# Patient Record
Sex: MALE | Race: WHITE | NOT HISPANIC OR LATINO
[De-identification: names, ages, dates, MRNs, and addresses within clinical notes are randomized per-mention and may not be internally consistent; named-entity substitution may affect disease eponyms.]

---

## 2017-03-25 PROBLEM — Z00.00 ENCOUNTER FOR PREVENTIVE HEALTH EXAMINATION: Status: ACTIVE | Noted: 2017-03-25

## 2017-04-10 ENCOUNTER — APPOINTMENT (OUTPATIENT)
Dept: UROLOGY | Facility: CLINIC | Age: 62
End: 2017-04-10

## 2017-04-10 ENCOUNTER — TRANSCRIPTION ENCOUNTER (OUTPATIENT)
Age: 62
End: 2017-04-10

## 2017-04-10 VITALS — WEIGHT: 225 LBS | HEIGHT: 76 IN | BODY MASS INDEX: 27.4 KG/M2

## 2017-04-10 DIAGNOSIS — Z78.9 OTHER SPECIFIED HEALTH STATUS: ICD-10-CM

## 2017-04-10 DIAGNOSIS — Z87.438 PERSONAL HISTORY OF OTHER DISEASES OF MALE GENITAL ORGANS: ICD-10-CM

## 2017-04-10 RX ORDER — AZELASTINE HYDROCHLORIDE 0.5 MG/ML
0.05 SOLUTION/ DROPS OPHTHALMIC
Qty: 6 | Refills: 0 | Status: ACTIVE | COMMUNITY
Start: 2017-03-06

## 2017-04-10 RX ORDER — FLUTICASONE PROPIONATE 50 UG/1
50 SPRAY, METERED NASAL
Qty: 16 | Refills: 0 | Status: ACTIVE | COMMUNITY
Start: 2017-03-04

## 2017-04-11 LAB
ALBUMIN SERPL ELPH-MCNC: 4.6 G/DL
ALP BLD-CCNC: 81 U/L
ALT SERPL-CCNC: 45 U/L
ANION GAP SERPL CALC-SCNC: 17 MMOL/L
AST SERPL-CCNC: 44 U/L
BASOPHILS # BLD AUTO: 0.02 K/UL
BASOPHILS NFR BLD AUTO: 0.3 %
BILIRUB SERPL-MCNC: 0.5 MG/DL
BUN SERPL-MCNC: 17 MG/DL
CALCIUM SERPL-MCNC: 9.7 MG/DL
CHLORIDE SERPL-SCNC: 104 MMOL/L
CO2 SERPL-SCNC: 21 MMOL/L
CREAT SERPL-MCNC: 0.97 MG/DL
EOSINOPHIL # BLD AUTO: 0.19 K/UL
EOSINOPHIL NFR BLD AUTO: 2.9 %
ESTRADIOL SERPL-MCNC: 21 PG/ML
GLUCOSE SERPL-MCNC: 94 MG/DL
HCT VFR BLD CALC: 42.3 %
HGB BLD-MCNC: 13.4 G/DL
IMM GRANULOCYTES NFR BLD AUTO: 0.2 %
LYMPHOCYTES # BLD AUTO: 1.86 K/UL
LYMPHOCYTES NFR BLD AUTO: 28.5 %
MAN DIFF?: NORMAL
MCHC RBC-ENTMCNC: 30.5 PG
MCHC RBC-ENTMCNC: 31.7 GM/DL
MCV RBC AUTO: 96.4 FL
MONOCYTES # BLD AUTO: 0.5 K/UL
MONOCYTES NFR BLD AUTO: 7.7 %
NEUTROPHILS # BLD AUTO: 3.95 K/UL
NEUTROPHILS NFR BLD AUTO: 60.4 %
PLATELET # BLD AUTO: 262 K/UL
POTASSIUM SERPL-SCNC: 4.9 MMOL/L
PROT SERPL-MCNC: 7 G/DL
RBC # BLD: 4.39 M/UL
RBC # FLD: 14.3 %
SODIUM SERPL-SCNC: 142 MMOL/L
TESTOST SERPL-MCNC: 645.5 NG/DL
WBC # FLD AUTO: 6.53 K/UL

## 2017-04-28 ENCOUNTER — MESSAGE (OUTPATIENT)
Age: 62
End: 2017-04-28

## 2017-05-19 ENCOUNTER — APPOINTMENT (OUTPATIENT)
Dept: UROLOGY | Facility: CLINIC | Age: 62
End: 2017-05-19

## 2017-06-27 ENCOUNTER — OTHER (OUTPATIENT)
Age: 62
End: 2017-06-27

## 2017-08-04 ENCOUNTER — APPOINTMENT (OUTPATIENT)
Dept: UROLOGY | Facility: CLINIC | Age: 62
End: 2017-08-04
Payer: COMMERCIAL

## 2017-08-04 PROCEDURE — 54235 NJX CORPORA CAVERNOSA RX AGT: CPT

## 2017-09-11 ENCOUNTER — APPOINTMENT (OUTPATIENT)
Dept: UROLOGY | Facility: CLINIC | Age: 62
End: 2017-09-11
Payer: COMMERCIAL

## 2017-09-11 PROCEDURE — 99213 OFFICE O/P EST LOW 20 MIN: CPT

## 2017-09-11 RX ORDER — TADALAFIL 20 MG/1
20 TABLET, FILM COATED ORAL
Qty: 9 | Refills: 3 | Status: DISCONTINUED | OUTPATIENT
Start: 2017-04-10 | End: 2017-09-11

## 2017-09-11 RX ORDER — TESTOSTERONE 50 MG/5G
50 MG/5GM GEL TRANSDERMAL DAILY
Qty: 30 | Refills: 0 | Status: DISCONTINUED | COMMUNITY
Start: 2017-04-10 | End: 2017-09-11

## 2017-12-04 ENCOUNTER — APPOINTMENT (OUTPATIENT)
Dept: UROLOGY | Facility: CLINIC | Age: 62
End: 2017-12-04
Payer: COMMERCIAL

## 2017-12-04 ENCOUNTER — OTHER (OUTPATIENT)
Age: 62
End: 2017-12-04

## 2017-12-04 PROCEDURE — 99213 OFFICE O/P EST LOW 20 MIN: CPT

## 2017-12-05 ENCOUNTER — MOBILE ON CALL (OUTPATIENT)
Age: 62
End: 2017-12-05

## 2017-12-05 LAB
APPEARANCE: CLEAR
BACTERIA: NEGATIVE
BILIRUBIN URINE: NEGATIVE
BLOOD URINE: NEGATIVE
COLOR: ABNORMAL
GLUCOSE QUALITATIVE U: NEGATIVE MG/DL
HYALINE CASTS: 1 /LPF
KETONES URINE: NEGATIVE
LEUKOCYTE ESTERASE URINE: NEGATIVE
MICROSCOPIC-UA: NORMAL
NITRITE URINE: NEGATIVE
PH URINE: 7
PROTEIN URINE: NEGATIVE MG/DL
RED BLOOD CELLS URINE: 6 /HPF
SPECIFIC GRAVITY URINE: 1.03
SQUAMOUS EPITHELIAL CELLS: 0 /HPF
UROBILINOGEN URINE: NEGATIVE MG/DL
WHITE BLOOD CELLS URINE: 0 /HPF

## 2017-12-07 LAB
ANION GAP SERPL CALC-SCNC: 14 MMOL/L
BUN SERPL-MCNC: 17 MG/DL
CALCIUM SERPL-MCNC: 9.7 MG/DL
CHLORIDE SERPL-SCNC: 102 MMOL/L
CO2 SERPL-SCNC: 26 MMOL/L
CREAT SERPL-MCNC: 1.11 MG/DL
GLUCOSE SERPL-MCNC: 92 MG/DL
POTASSIUM SERPL-SCNC: 5 MMOL/L
PSA SERPL-MCNC: 0.47 NG/ML
SODIUM SERPL-SCNC: 142 MMOL/L

## 2017-12-08 LAB
BACTERIA UR CULT: NORMAL
CORE LAB FLUID CYTOLOGY: NORMAL

## 2018-06-25 ENCOUNTER — APPOINTMENT (OUTPATIENT)
Dept: UROLOGY | Facility: CLINIC | Age: 63
End: 2018-06-25
Payer: COMMERCIAL

## 2018-06-25 PROCEDURE — 99213 OFFICE O/P EST LOW 20 MIN: CPT

## 2018-06-26 LAB
APPEARANCE: CLEAR
BACTERIA: NEGATIVE
BILIRUBIN URINE: NEGATIVE
BLOOD URINE: NEGATIVE
COLOR: YELLOW
GLUCOSE QUALITATIVE U: NEGATIVE MG/DL
HYALINE CASTS: 1 /LPF
KETONES URINE: NEGATIVE
LEUKOCYTE ESTERASE URINE: NEGATIVE
MICROSCOPIC-UA: NORMAL
NITRITE URINE: NEGATIVE
PH URINE: 5
PROTEIN URINE: NEGATIVE MG/DL
RED BLOOD CELLS URINE: 1 /HPF
SPECIFIC GRAVITY URINE: 1.02
SQUAMOUS EPITHELIAL CELLS: 0 /HPF
UROBILINOGEN URINE: NEGATIVE MG/DL
WHITE BLOOD CELLS URINE: 1 /HPF

## 2018-07-27 PROBLEM — Z78.9 ALCOHOL USE: Status: ACTIVE | Noted: 2017-04-10

## 2018-12-10 ENCOUNTER — APPOINTMENT (OUTPATIENT)
Dept: UROLOGY | Facility: CLINIC | Age: 63
End: 2018-12-10
Payer: COMMERCIAL

## 2018-12-10 DIAGNOSIS — N53.19 OTHER EJACULATORY DYSFUNCTION: ICD-10-CM

## 2018-12-10 DIAGNOSIS — Z85.828 PERSONAL HISTORY OF OTHER MALIGNANT NEOPLASM OF SKIN: ICD-10-CM

## 2018-12-10 PROCEDURE — 51741 ELECTRO-UROFLOWMETRY FIRST: CPT

## 2018-12-10 PROCEDURE — 99214 OFFICE O/P EST MOD 30 MIN: CPT | Mod: 25

## 2018-12-10 PROCEDURE — 51798 US URINE CAPACITY MEASURE: CPT | Mod: 59

## 2018-12-10 RX ORDER — TADALAFIL 20 MG/1
20 TABLET ORAL
Qty: 10 | Refills: 3 | Status: ACTIVE | COMMUNITY
Start: 2018-06-25 | End: 1900-01-01

## 2018-12-11 LAB
APPEARANCE: CLEAR
BACTERIA: NEGATIVE
BILIRUBIN URINE: NEGATIVE
BLOOD URINE: NEGATIVE
COLOR: YELLOW
GLUCOSE QUALITATIVE U: NEGATIVE MG/DL
HYALINE CASTS: 1 /LPF
KETONES URINE: NEGATIVE
LEUKOCYTE ESTERASE URINE: NEGATIVE
MICROSCOPIC-UA: NORMAL
NITRITE URINE: NEGATIVE
PH URINE: 5
PROTEIN URINE: NEGATIVE MG/DL
RED BLOOD CELLS URINE: 0 /HPF
SPECIFIC GRAVITY URINE: 1.02
SQUAMOUS EPITHELIAL CELLS: 0 /HPF
UROBILINOGEN URINE: NEGATIVE MG/DL
WHITE BLOOD CELLS URINE: 0 /HPF

## 2018-12-19 LAB
ANION GAP SERPL CALC-SCNC: 11 MMOL/L
BUN SERPL-MCNC: 19 MG/DL
CALCIUM SERPL-MCNC: 9.6 MG/DL
CHLORIDE SERPL-SCNC: 102 MMOL/L
CO2 SERPL-SCNC: 26 MMOL/L
CREAT SERPL-MCNC: 1 MG/DL
GLUCOSE SERPL-MCNC: 91 MG/DL
POTASSIUM SERPL-SCNC: 4.7 MMOL/L
PSA SERPL-MCNC: 0.5 NG/ML
SODIUM SERPL-SCNC: 139 MMOL/L
TESTOST SERPL-MCNC: 327.7 NG/DL

## 2019-12-05 ENCOUNTER — TRANSCRIPTION ENCOUNTER (OUTPATIENT)
Age: 64
End: 2019-12-05

## 2019-12-11 ENCOUNTER — RX RENEWAL (OUTPATIENT)
Age: 64
End: 2019-12-11

## 2019-12-16 ENCOUNTER — APPOINTMENT (OUTPATIENT)
Dept: UROLOGY | Facility: CLINIC | Age: 64
End: 2019-12-16
Payer: COMMERCIAL

## 2019-12-16 PROCEDURE — 51741 ELECTRO-UROFLOWMETRY FIRST: CPT

## 2019-12-16 PROCEDURE — 51798 US URINE CAPACITY MEASURE: CPT | Mod: 59

## 2019-12-16 PROCEDURE — 99213 OFFICE O/P EST LOW 20 MIN: CPT | Mod: 25

## 2019-12-16 RX ORDER — TADALAFIL 20 MG/1
20 TABLET, FILM COATED ORAL
Qty: 3 | Refills: 0 | Status: DISCONTINUED | COMMUNITY
Start: 2016-07-13 | End: 2019-12-16

## 2019-12-16 RX ORDER — KETOCONAZOLE 20.5 MG/ML
2 SHAMPOO, SUSPENSION TOPICAL
Qty: 120 | Refills: 0 | Status: DISCONTINUED | COMMUNITY
Start: 2017-01-03 | End: 2019-12-16

## 2019-12-16 RX ORDER — TESTOSTERONE 50 MG/5G
50 MG/5GM GEL TRANSDERMAL
Qty: 150 | Refills: 0 | Status: DISCONTINUED | COMMUNITY
Start: 2016-12-27 | End: 2019-12-16

## 2019-12-16 NOTE — ASSESSMENT
[FreeTextEntry1] : Testosterone T 503\par free T 42 (normal )\par \par urinary symptoms stable\par low PVR and flow max 13.6\par discussed medical management \par patient not interested\par \par continue to tadalafil and BIMIX\par will renew\par no fibrosis

## 2019-12-16 NOTE — PHYSICAL EXAM
[General Appearance - Well Developed] : well developed [General Appearance - Well Nourished] : well nourished [Well Groomed] : well groomed [General Appearance - In No Acute Distress] : no acute distress [Normal Appearance] : normal appearance [Abdomen Soft] : soft [Abdomen Tenderness] : non-tender [Urethral Meatus] : meatus normal [Costovertebral Angle Tenderness] : no ~M costovertebral angle tenderness [Scrotum] : the scrotum was normal [Urinary Bladder Findings] : the bladder was normal on palpation [Testes Mass (___cm)] : there were no testicular masses [No Prostate Nodules] : no prostate nodules [Prostate Enlargement] : the prostate was not enlarged [Edema] : no peripheral edema [Skin Color & Pigmentation] : normal skin color and pigmentation [Oriented To Time, Place, And Person] : oriented to person, place, and time [Exaggerated Use Of Accessory Muscles For Inspiration] : no accessory muscle use [Normal Station and Gait] : the gait and station were normal for the patient's age [Motor Exam] : the motor exam was normal [Inguinal Lymph Nodes Enlarged Bilaterally] : inguinal [FreeTextEntry1] : no fibrosis; 12 cm

## 2019-12-16 NOTE — HISTORY OF PRESENT ILLNESS
[FreeTextEntry1] : Patient started on BIMIX\par Has dose escalated on 14 units\par Erection 8-9/10\par lasted 1 hour\par Able to have SI\par No pain\par No curvature\par \par 12.4.2017\par utilizing BIMIX 14 units\par "supplementing with Cialis 20 mg"\par lasting one hour \par erection 7.5/10\par has not dose escalated \par no curvature\par \par 6.25.2018\par followup ED\par Cialis by itself not as effective \par needs BIMIX 14-15 better 8-9/10\par Has not attempted to BIMIX dose escalation\par not effective for 15 minutes after injection\par last 1 hour\par \par 12.10.2018\par continues to utilize BIMIX 20 units\par excellent erection\par 2-3 per month\par utilizes Cialis 20 mg cha\par urinary frequency persists; variable\par \par 12.16.2019\par urinary frequency \par variable nocturia\par no hematuria \par no dysuria\par taking Cialis variable\par utilizing BIMIX injection 22 units\par satisfactory pleased

## 2019-12-17 LAB
APPEARANCE: CLEAR
BACTERIA: NEGATIVE
BILIRUBIN URINE: NEGATIVE
BLOOD URINE: NEGATIVE
COLOR: NORMAL
GLUCOSE QUALITATIVE U: NEGATIVE
HYALINE CASTS: 1 /LPF
KETONES URINE: NEGATIVE
LEUKOCYTE ESTERASE URINE: NEGATIVE
MICROSCOPIC-UA: NORMAL
NITRITE URINE: NEGATIVE
PH URINE: 5
PROTEIN URINE: NEGATIVE
RED BLOOD CELLS URINE: 1 /HPF
SPECIFIC GRAVITY URINE: 1.02
SQUAMOUS EPITHELIAL CELLS: 0 /HPF
UROBILINOGEN URINE: NORMAL
WHITE BLOOD CELLS URINE: 0 /HPF

## 2019-12-27 ENCOUNTER — TRANSCRIPTION ENCOUNTER (OUTPATIENT)
Age: 64
End: 2019-12-27

## 2019-12-27 LAB — PSA SERPL-MCNC: 0.29 NG/ML

## 2020-06-08 ENCOUNTER — APPOINTMENT (OUTPATIENT)
Dept: UROLOGY | Facility: CLINIC | Age: 65
End: 2020-06-08

## 2020-08-10 ENCOUNTER — APPOINTMENT (OUTPATIENT)
Dept: UROLOGY | Facility: CLINIC | Age: 65
End: 2020-08-10
Payer: COMMERCIAL

## 2020-08-10 VITALS
HEIGHT: 76 IN | TEMPERATURE: 97.8 F | OXYGEN SATURATION: 98 % | DIASTOLIC BLOOD PRESSURE: 83 MMHG | BODY MASS INDEX: 28.01 KG/M2 | WEIGHT: 230 LBS | SYSTOLIC BLOOD PRESSURE: 158 MMHG | HEART RATE: 75 BPM

## 2020-08-10 PROCEDURE — 51798 US URINE CAPACITY MEASURE: CPT | Mod: 59

## 2020-08-10 PROCEDURE — 99213 OFFICE O/P EST LOW 20 MIN: CPT | Mod: 25

## 2020-08-10 PROCEDURE — 51741 ELECTRO-UROFLOWMETRY FIRST: CPT

## 2020-08-10 NOTE — ASSESSMENT
[FreeTextEntry1] : Doing well \par continues on IC\par 22 units/ 1 hour\par \par libiido "low" \par had been on T replacement then stopped secondary to insurance\par will recheck T\par "internist said low"\par will reassess\par \par flow excellent\par mild LUTS\par PVR 44\par not interested in medical therapy\par \par [employer]\par second daughter Mari\par \par

## 2020-08-10 NOTE — PHYSICAL EXAM
[General Appearance - Well Nourished] : well nourished [Normal Appearance] : normal appearance [General Appearance - Well Developed] : well developed [Abdomen Soft] : soft [Well Groomed] : well groomed [General Appearance - In No Acute Distress] : no acute distress [Abdomen Tenderness] : non-tender [Urethral Meatus] : meatus normal [Costovertebral Angle Tenderness] : no ~M costovertebral angle tenderness [Testes Mass (___cm)] : there were no testicular masses [Scrotum] : the scrotum was normal [Urinary Bladder Findings] : the bladder was normal on palpation [Prostate Enlargement] : the prostate was not enlarged [No Prostate Nodules] : no prostate nodules [Normal Station and Gait] : the gait and station were normal for the patient's age [Skin Color & Pigmentation] : normal skin color and pigmentation [FreeTextEntry1] : seeing dermatologist

## 2020-08-10 NOTE — HISTORY OF PRESENT ILLNESS
[FreeTextEntry1] : Patient started on BIMIX\par Has dose escalated on 14 units\par Erection 8-9/10\par lasted 1 hour\par Able to have SI\par No pain\par No curvature\par \par 12.4.2017\par utilizing BIMIX 14 units\par "supplementing with Cialis 20 mg"\par lasting one hour \par erection 7.5/10\par has not dose escalated \par no curvature\par \par 6.25.2018\par followup ED\par Cialis by itself not as effective \par needs BIMIX 14-15 better 8-9/10\par Has not attempted to BIMIX dose escalation\par not effective for 15 minutes after injection\par last 1 hour\par \par 12.10.2018\par continues to utilize BIMIX 20 units\par excellent erection\par 2-3 per month\par utilizes Cialis 20 mg cha\par urinary frequency persists; variable\par \par 12.16.2019\par urinary frequency \par variable nocturia\par no hematuria \par no dysuria\par taking Cialis variable\par utilizing BIMIX injection 22 units\par satisfactory pleased\par \par 8.10.2020

## 2020-08-11 ENCOUNTER — TRANSCRIPTION ENCOUNTER (OUTPATIENT)
Age: 65
End: 2020-08-11

## 2020-08-11 LAB
APPEARANCE: CLEAR
BACTERIA: NEGATIVE
BILIRUBIN URINE: NEGATIVE
BLOOD URINE: NEGATIVE
COLOR: COLORLESS
GLUCOSE QUALITATIVE U: NEGATIVE
HYALINE CASTS: 0 /LPF
KETONES URINE: NEGATIVE
LEUKOCYTE ESTERASE URINE: NEGATIVE
LH SERPL-ACNC: 3.8 IU/L
MICROSCOPIC-UA: NORMAL
NITRITE URINE: NEGATIVE
PH URINE: 5.5
PROLACTIN SERPL-MCNC: 14.4 NG/ML
PROTEIN URINE: NEGATIVE
RED BLOOD CELLS URINE: 0 /HPF
SPECIFIC GRAVITY URINE: 1.01
SQUAMOUS EPITHELIAL CELLS: 0 /HPF
TESTOST SERPL-MCNC: 247 NG/DL
UROBILINOGEN URINE: NORMAL
WHITE BLOOD CELLS URINE: 0 /HPF

## 2021-04-30 ENCOUNTER — APPOINTMENT (OUTPATIENT)
Dept: UROLOGY | Facility: CLINIC | Age: 66
End: 2021-04-30
Payer: COMMERCIAL

## 2021-04-30 PROCEDURE — 99072 ADDL SUPL MATRL&STAF TM PHE: CPT

## 2021-04-30 PROCEDURE — 99214 OFFICE O/P EST MOD 30 MIN: CPT

## 2021-04-30 NOTE — HISTORY OF PRESENT ILLNESS
[Currently Experiencing ___] :  [unfilled] [Urinary Frequency] : urinary frequency [Nocturia] : nocturia [FreeTextEntry1] : Patient started on BIMIX\par Has dose escalated on 14 units\par Erection 8-9/10\par lasted 1 hour\par Able to have SI\par No pain\par No curvature\par \par 12.4.2017\par utilizing BIMIX 14 units\par "supplementing with Cialis 20 mg"\par lasting one hour \par erection 7.5/10\par has not dose escalated \par no curvature\par \par 6.25.2018\par followup ED\par Cialis by itself not as effective \par needs BIMIX 14-15 better 8-9/10\par Has not attempted to BIMIX dose escalation\par not effective for 15 minutes after injection\par last 1 hour\par \par 12.10.2018\par continues to utilize BIMIX 20 units\par excellent erection\par 2-3 per month\par utilizes Cialis 20 mg cha\par urinary frequency persists; variable\par \par 12.16.2019\par urinary frequency \par variable nocturia\par no hematuria \par no dysuria\par taking Cialis variable\par utilizing BIMIX injection 22 units\par satisfactory pleased\par \par 8.10.2020\par \par 4.20.2021\par continue on Bimix 24 units\par excellent response\par pleased with response\par tadalafil 20 \par urinary frequency\par IPSS 13\par  [Urinary Incontinence] : no urinary incontinence [Urinary Retention] : no urinary retention [Straining] : no straining

## 2021-04-30 NOTE — ASSESSMENT
[FreeTextEntry1] : Doing well \par continues on IC\par 22 units/ 1 hour\par \par libiido "low" \par had been on T replacement then stopped secondary to insurance\par will recheck T\par "internist said low"\par will reassess\par \par flow excellent\par mild LUTS\par PVR 44\par not interested in medical therapy\par \par [employer] degree\par second daughter Mari\par \par \par 4.30.2021\par pleased with response to injection therapy\par had been on finasteride 1 mg n but has not taken x 1 year\par LUTs   \par Flow 19.6 cc ; voided 185\par PVR 72\par continue to monitor\par \par ED \par continue Bimix and Talafil\par \par PSA screening\par Discussed PSA monitoring and controversy. \par Patient wants to proceed,\par

## 2021-04-30 NOTE — PHYSICAL EXAM
[Abdomen Soft] : soft [Abdomen Tenderness] : non-tender [Costovertebral Angle Tenderness] : no ~M costovertebral angle tenderness [Urethral Meatus] : meatus normal [Urinary Bladder Findings] : the bladder was normal on palpation [Testes Mass (___cm)] : there were no testicular masses [Scrotum] : the scrotum was normal [No Prostate Nodules] : no prostate nodules [Prostate Size ___ (0-4)] : prostate size [unfilled] (scale: 0-4)

## 2021-05-01 ENCOUNTER — TRANSCRIPTION ENCOUNTER (OUTPATIENT)
Age: 66
End: 2021-05-01

## 2021-05-01 LAB
ANION GAP SERPL CALC-SCNC: 13 MMOL/L
APPEARANCE: CLEAR
BACTERIA: NEGATIVE
BILIRUBIN URINE: NEGATIVE
BLOOD URINE: NEGATIVE
BUN SERPL-MCNC: 21 MG/DL
CALCIUM SERPL-MCNC: 9.6 MG/DL
CHLORIDE SERPL-SCNC: 107 MMOL/L
CO2 SERPL-SCNC: 23 MMOL/L
COLOR: NORMAL
CREAT SERPL-MCNC: 1.05 MG/DL
GLUCOSE QUALITATIVE U: NEGATIVE
GLUCOSE SERPL-MCNC: 103 MG/DL
HYALINE CASTS: 0 /LPF
KETONES URINE: NEGATIVE
LEUKOCYTE ESTERASE URINE: NEGATIVE
MICROSCOPIC-UA: NORMAL
NITRITE URINE: NEGATIVE
PH URINE: 5
POTASSIUM SERPL-SCNC: 4.5 MMOL/L
PROTEIN URINE: NEGATIVE
PSA SERPL-MCNC: 0.41 NG/ML
RED BLOOD CELLS URINE: 0 /HPF
SODIUM SERPL-SCNC: 142 MMOL/L
SPECIFIC GRAVITY URINE: 1.01
SQUAMOUS EPITHELIAL CELLS: 0 /HPF
TESTOST SERPL-MCNC: 283 NG/DL
UROBILINOGEN URINE: NORMAL
WHITE BLOOD CELLS URINE: 0 /HPF

## 2021-05-03 ENCOUNTER — TRANSCRIPTION ENCOUNTER (OUTPATIENT)
Age: 66
End: 2021-05-03

## 2022-05-09 ENCOUNTER — APPOINTMENT (OUTPATIENT)
Dept: UROLOGY | Facility: CLINIC | Age: 67
End: 2022-05-09

## 2022-05-31 ENCOUNTER — APPOINTMENT (OUTPATIENT)
Dept: UROLOGY | Facility: CLINIC | Age: 67
End: 2022-05-31
Payer: COMMERCIAL

## 2022-05-31 VITALS — SYSTOLIC BLOOD PRESSURE: 162 MMHG | DIASTOLIC BLOOD PRESSURE: 81 MMHG | TEMPERATURE: 98.7 F | HEART RATE: 65 BPM

## 2022-05-31 PROCEDURE — 51741 ELECTRO-UROFLOWMETRY FIRST: CPT

## 2022-05-31 PROCEDURE — 99214 OFFICE O/P EST MOD 30 MIN: CPT

## 2022-05-31 PROCEDURE — 51798 US URINE CAPACITY MEASURE: CPT

## 2022-05-31 RX ORDER — LOSARTAN POTASSIUM 100 MG/1
TABLET, FILM COATED ORAL
Refills: 0 | Status: ACTIVE | COMMUNITY

## 2022-05-31 NOTE — PHYSICAL EXAM
[Abdomen Soft] : soft [Abdomen Tenderness] : non-tender [Costovertebral Angle Tenderness] : no ~M costovertebral angle tenderness [Urethral Meatus] : meatus normal [Urinary Bladder Findings] : the bladder was normal on palpation [Scrotum] : the scrotum was normal [Testes Mass (___cm)] : there were no testicular masses [No Prostate Nodules] : no prostate nodules [Penis Abnormality] : normal circumcised penis [FreeTextEntry1] : flow rated 14.7 cc/  volume 158; PVR 71 ; no induration

## 2022-05-31 NOTE — HISTORY OF PRESENT ILLNESS
[FreeTextEntry1] : Patient started on BIMIX\par Has dose escalated on 14 units\par Erection 8-9/10\par lasted 1 hour\par Able to have SI\par No pain\par No curvature\par \par 12.4.2017\par utilizing BIMIX 14 units\par "supplementing with Cialis 20 mg"\par lasting one hour \par erection 7.5/10\par has not dose escalated \par no curvature\par \par 6.25.2018\par followup ED\par Cialis by itself not as effective \par needs BIMIX 14-15 better 8-9/10\par Has not attempted to BIMIX dose escalation\par not effective for 15 minutes after injection\par last 1 hour\par \par 12.10.2018\par continues to utilize BIMIX 20 units\par excellent erection\par 2-3 per month\par utilizes Cialis 20 mg cha\par urinary frequency persists; variable\par \par 12.16.2019\par urinary frequency \par variable nocturia\par no hematuria \par no dysuria\par taking Cialis variable\par utilizing BIMIX injection 22 units\par satisfactory pleased\par \par 8.10.2020\par \par 4.20.2021\par continue on Bimix 24 units\par excellent response\par pleased with response\par tadalafil 20 \par urinary frequency\par IPSS 13\par \par 5.31.2022\par started on losartan for bp\par receiving infusion for neuropathy\par continues to use BIMIX  for sexual intercourse 20 units; erection x one hour\par oral medications not sufficient\par no penile curvature or pain\par

## 2022-05-31 NOTE — ASSESSMENT
[FreeTextEntry1] : Doing well \par continues on IC\par 22 units/ 1 hour\par \par libiido "low" \par had been on T replacement then stopped secondary to insurance\par will recheck T\par "internist said low"\par will reassess\par \par flow excellent\par mild LUTS\par PVR 44\par not interested in medical therapy\par \par [employer]\par second daughter Mari\par \par \par \par 5.31.2022\par \par \par LUTS not interested in medical management or minimally invasive treatment\par flow and PVR stable\par \par ED contniues on BIMIX 20 units\par no curvaturel pain or prolonged erection\par pleased\par gets erection with tadalafil; not sufficient for SI\par \par history of hypogonadism\par had been on T ; lost insurance approval; subsequently levels satisfactory\par will recheck\par \par Plan:\par 1. tadalafil\par 2. BIMIX 30/2\par 3. PSA, BMP and T\par 4. followup in 6 months

## 2022-06-01 ENCOUNTER — TRANSCRIPTION ENCOUNTER (OUTPATIENT)
Age: 67
End: 2022-06-01

## 2022-06-01 LAB
ANION GAP SERPL CALC-SCNC: 12 MMOL/L
APPEARANCE: CLEAR
BACTERIA: NEGATIVE
BILIRUBIN URINE: NEGATIVE
BLOOD URINE: NEGATIVE
BUN SERPL-MCNC: 18 MG/DL
CALCIUM SERPL-MCNC: 9.3 MG/DL
CHLORIDE SERPL-SCNC: 105 MMOL/L
CO2 SERPL-SCNC: 24 MMOL/L
COLOR: COLORLESS
CREAT SERPL-MCNC: 1.06 MG/DL
EGFR: 78 ML/MIN/1.73M2
GLUCOSE QUALITATIVE U: NEGATIVE
GLUCOSE SERPL-MCNC: 92 MG/DL
HYALINE CASTS: 0 /LPF
KETONES URINE: NEGATIVE
LEUKOCYTE ESTERASE URINE: NEGATIVE
MICROSCOPIC-UA: NORMAL
NITRITE URINE: NEGATIVE
PH URINE: 6
POTASSIUM SERPL-SCNC: 4.3 MMOL/L
PROTEIN URINE: NEGATIVE
PSA SERPL-MCNC: 0.53 NG/ML
RED BLOOD CELLS URINE: 0 /HPF
SODIUM SERPL-SCNC: 141 MMOL/L
SPECIFIC GRAVITY URINE: 1.01
SQUAMOUS EPITHELIAL CELLS: 0 /HPF
TESTOST SERPL-MCNC: 273 NG/DL
UROBILINOGEN URINE: NORMAL
WHITE BLOOD CELLS URINE: 0 /HPF

## 2022-08-04 ENCOUNTER — APPOINTMENT (OUTPATIENT)
Dept: RADIOLOGY | Facility: HOSPITAL | Age: 67
End: 2022-08-04

## 2023-01-03 ENCOUNTER — APPOINTMENT (OUTPATIENT)
Dept: UROLOGY | Facility: CLINIC | Age: 68
End: 2023-01-03
Payer: COMMERCIAL

## 2023-01-03 VITALS
SYSTOLIC BLOOD PRESSURE: 129 MMHG | HEART RATE: 60 BPM | BODY MASS INDEX: 27.4 KG/M2 | WEIGHT: 225 LBS | OXYGEN SATURATION: 98 % | DIASTOLIC BLOOD PRESSURE: 81 MMHG | TEMPERATURE: 98.3 F | HEIGHT: 76 IN

## 2023-01-03 PROCEDURE — 99214 OFFICE O/P EST MOD 30 MIN: CPT

## 2023-01-03 RX ORDER — TESTOSTERONE 40.5 MG/2.5G
40.5 MG/2.5GM GEL TOPICAL
Qty: 1 | Refills: 0 | Status: ACTIVE | COMMUNITY
Start: 2023-01-03 | End: 1900-01-01

## 2023-01-03 NOTE — PHYSICAL EXAM
[Abdomen Soft] : soft [Abdomen Tenderness] : non-tender [Costovertebral Angle Tenderness] : no ~M costovertebral angle tenderness [Urethral Meatus] : meatus normal [Penis Abnormality] : normal circumcised penis [Urinary Bladder Findings] : the bladder was normal on palpation [Scrotum] : the scrotum was normal [Testes Mass (___cm)] : there were no testicular masses [Prostate Tenderness] : the prostate was not tender [No Prostate Nodules] : no prostate nodules [FreeTextEntry1] :  10 cm; no induration

## 2023-01-03 NOTE — HISTORY OF PRESENT ILLNESS
[FreeTextEntry1] : Patient started on BIMIX\par Has dose escalated on 14 units\par Erection 8-9/10\par lasted 1 hour\par Able to have SI\par No pain\par No curvature\par \par 12.4.2017\par utilizing BIMIX 14 units\par "supplementing with Cialis 20 mg"\par lasting one hour \par erection 7.5/10\par has not dose escalated \par no curvature\par \par 6.25.2018\par followup ED\par Cialis by itself not as effective \par needs BIMIX 14-15 better 8-9/10\par Has not attempted to BIMIX dose escalation\par not effective for 15 minutes after injection\par last 1 hour\par \par 12.10.2018\par continues to utilize BIMIX 20 units\par excellent erection\par 2-3 per month\par utilizes Cialis 20 mg cha\par urinary frequency persists; variable\par \par 12.16.2019\par urinary frequency \par variable nocturia\par no hematuria \par no dysuria\par taking Cialis variable\par utilizing BIMIX injection 22 units\par satisfactory pleased\par \par 8.10.2020\par \par 4.20.2021\par continue on Bimix 24 units\par excellent response\par pleased with response\par tadalafil 20 \par urinary frequency\par IPSS 13\par \par 5.31.2022\par started on losartan for bp\par receiving infusion for neuropathy\par continues to use BIMIX  for sexual intercourse 20 units; erection x one hour\par oral medications not sufficient\par no penile curvature or pain\par \par 1.3.2023\par continue on Bimix\par sexual intercourse once per month\par low libido\par had been on T previously\par \par

## 2023-01-03 NOTE — ASSESSMENT
[FreeTextEntry1] : Doing well \par continues on IC\par 22 units/ 1 hour\par \par libiido "low" \par had been on T replacement then stopped secondary to insurance\par will recheck T\par "internist said low"\par will reassess\par \par flow excellent\par mild LUTS\par PVR 44\par not interested in medical therapy\par \par [employer] degree\par second daughter Mari\par \par \par \par 5.31.2022\par \par \par LUTS not interested in medical management or minimally invasive treatment\par flow and PVR stable\par \par ED contniues on BIMIX 20 units\par no curvaturel pain or prolonged erection\par pleased\par gets erection with tadalafil; not sufficient for SI\par \par history of hypogonadism\par had been on T ; lost insurance approval; subsequently levels satisfactory\par will recheck\par \par Plan:\par 1. tadalafil\par 2. BIMIX 30/2\par 3. PSA, BMP and T\par 4. followup in 6 months\par \par 1.3.2023\par returns re ED\par continues on tadalafil and BIMIX, good response\par no curvature \par \par IPSS 14\par PATRICIA 18\par flow: avg rate 4.8 ml/s, max rate 10.3 ml/s (voided 54 cc)\par PVR 0 cc\par not interested in medical treatment\par \par \par discussed prior labs\par PSA 0.53\par T 273\par DEXA scan normal\par had been on T replacement\par stopped secondary to insurance issues\par interested in T replacement\par \par We had an extensive discussion re Risks of T replacement; Patient was informed about Black box warning from FDA.\par We discussed recent data regarding increased risk of coronary plaque size, heart disease; thrombolic event; increased Hbg/Hct, breast tenderness; acne; irritability; sleep apnea and increased urinary symptoms; effect on testicular function including suppression of spermatogenesis; hair loss (male pattern baldness) effect on LFTS; effect on prostate cancer.\par Different treatment approaches were discussed including IM, transdermal and Testopel\par We discussed advantages and disadvantages of each\par We discussed risk to T exposure to partners and children from transdermal approaches.\par Patient choses to proceed with transdermal T replacement\par \par \par Plan:\par 1. not interested in medical treatment for LUTS\par 2. continues on tadalafil and BIMIX\par 3. low T again document; wants to reinstitute\par 4. Androgel \par 5. labs in 3 weeks\par 6. fu in 4 weeks

## 2023-01-04 ENCOUNTER — NON-APPOINTMENT (OUTPATIENT)
Age: 68
End: 2023-01-04

## 2023-01-04 LAB
APPEARANCE: CLEAR
BACTERIA: NEGATIVE
BILIRUBIN URINE: NEGATIVE
BLOOD URINE: NEGATIVE
COLOR: NORMAL
GLUCOSE QUALITATIVE U: NEGATIVE
HYALINE CASTS: 0 /LPF
KETONES URINE: NEGATIVE
LEUKOCYTE ESTERASE URINE: NEGATIVE
MICROSCOPIC-UA: NORMAL
NITRITE URINE: NEGATIVE
PH URINE: 6
PROTEIN URINE: NEGATIVE
RED BLOOD CELLS URINE: 2 /HPF
SPECIFIC GRAVITY URINE: 1.01
SQUAMOUS EPITHELIAL CELLS: 0 /HPF
UROBILINOGEN URINE: NORMAL
WHITE BLOOD CELLS URINE: 0 /HPF

## 2023-02-08 ENCOUNTER — APPOINTMENT (OUTPATIENT)
Dept: UROLOGY | Facility: CLINIC | Age: 68
End: 2023-02-08

## 2023-04-20 ENCOUNTER — NON-APPOINTMENT (OUTPATIENT)
Age: 68
End: 2023-04-20

## 2023-07-12 ENCOUNTER — APPOINTMENT (OUTPATIENT)
Dept: UROLOGY | Facility: CLINIC | Age: 68
End: 2023-07-12
Payer: COMMERCIAL

## 2023-07-12 VITALS
BODY MASS INDEX: 27.4 KG/M2 | OXYGEN SATURATION: 96 % | DIASTOLIC BLOOD PRESSURE: 81 MMHG | SYSTOLIC BLOOD PRESSURE: 136 MMHG | HEIGHT: 76 IN | WEIGHT: 225 LBS | TEMPERATURE: 97 F | HEART RATE: 63 BPM

## 2023-07-12 DIAGNOSIS — R31.29 OTHER MICROSCOPIC HEMATURIA: ICD-10-CM

## 2023-07-12 DIAGNOSIS — Z12.5 ENCOUNTER FOR SCREENING FOR MALIGNANT NEOPLASM OF PROSTATE: ICD-10-CM

## 2023-07-12 PROCEDURE — 99214 OFFICE O/P EST MOD 30 MIN: CPT

## 2023-07-12 NOTE — PHYSICAL EXAM
[Urethral Meatus] : meatus normal [Penis Abnormality] : normal circumcised penis [Urinary Bladder Findings] : the bladder was normal on palpation [Scrotum] : the scrotum was normal [Testes Tenderness] : no tenderness of the testes [Testes Mass (___cm)] : there were no testicular masses [Prostate Tenderness] : the prostate was not tender [No Prostate Nodules] : no prostate nodules [FreeTextEntry1] :  10 cm; no induration; OSVALDO CLEMENT was offered to have a chaperone present  and declined.

## 2023-07-12 NOTE — ASSESSMENT
[FreeTextEntry1] : Doing well \par continues on IC\par 22 units/ 1 hour\par \par libido "low" \par had been on T replacement then stopped secondary to insurance\par will recheck T\par "internist said low"\par will reassess\par \par flow excellent\par mild LUTS\par PVR 44\par not interested in medical therapy\par \par [employer] degree\par second daughter Mari\par \par \par \par 5.31.2022\par \par \par LUTS not interested in medical management or minimally invasive treatment\par flow and PVR stable\par \par ED continues on BIMIX 20 units\par no curvature pain or prolonged erection\par pleased\par gets erection with tadalafil; not sufficient for SI\par \par history of hypogonadism\par had been on T ; lost insurance approval; subsequently levels satisfactory\par will recheck\par \par Plan:\par 1. tadalafil\par 2. BIMIX 30/2\par 3. PSA, BMP and T\par 4. followup in 6 months\par \par 1.3.2023\par returns re ED\par continues on tadalafil and BIMIX, good response\par no curvature \par \par IPSS 14\par PATRICIA 18\par flow: avg rate 4.8 ml/s, max rate 10.3 ml/s (voided 54 cc)\par PVR 0 cc\par not interested in medical treatment\par \par \par discussed prior labs\par PSA 0.53\par T 273\par DEXA scan normal\par had been on T replacement\par stopped secondary to insurance issues\par interested in T replacement\par \par We had an extensive discussion re Risks of T replacement; Patient was informed about Black box warning from FDA.\par We discussed recent data regarding increased risk of coronary plaque size, heart disease; thrombolic event; increased Hbg/Hct, breast tenderness; acne; irritability; sleep apnea and increased urinary symptoms; effect on testicular function including suppression of spermatogenesis; hair loss (male pattern baldness) effect on LFTS; effect on prostate cancer.\par Different treatment approaches were discussed including IM, transdermal and Testopel\par We discussed advantages and disadvantages of each\par We discussed risk to T exposure to partners and children from transdermal approaches.\par Patient choses to proceed with transdermal T replacement\par \par \par Plan:\par 1. not interested in medical treatment for LUTS\par 2. continues on tadalafil and BIMIX\par 3. low T again document; wants to reinstitute\par 4. AndroGel \par 5. labs in 3 weeks\par 6. fu in 4 weeks\par \par 7/12/2023\par \par Patient returns re sexual dysfunction\par we again reviewed his testosterone levels as well as the indications for testosterone supplementation.  He is unhappy using transdermal testosterone.  We discussed IM testosterone cypionate versus AVEED. \par Patient previously on T and does not think it effected function\par Will again consider\par \par \par  He is doing well with oral medications as well as BIMIX injections as needed. \par  He does not always utilize the BIMIX\par Injects 25 units BIMIX; last several hours\par Detumesced spontaneously\par Discussed tadalafil alone; patient not confident \par \par LUTS mild and not bothersome to patient\par Still not interested in medical therapy\par \par Discussed PSA monitoring and controversy. \par Patient wants to proceed,\par \par Plan:\par 1. PSA\par 2. tadalafil\par 3  BIMIX\par 4. followup in 6 months

## 2023-07-17 ENCOUNTER — TRANSCRIPTION ENCOUNTER (OUTPATIENT)
Age: 68
End: 2023-07-17

## 2023-07-18 ENCOUNTER — TRANSCRIPTION ENCOUNTER (OUTPATIENT)
Age: 68
End: 2023-07-18

## 2024-01-01 ENCOUNTER — TRANSCRIPTION ENCOUNTER (OUTPATIENT)
Age: 69
End: 2024-01-01

## 2024-01-02 ENCOUNTER — APPOINTMENT (OUTPATIENT)
Dept: UROLOGY | Facility: CLINIC | Age: 69
End: 2024-01-02

## 2024-01-11 ENCOUNTER — APPOINTMENT (OUTPATIENT)
Dept: UROLOGY | Facility: CLINIC | Age: 69
End: 2024-01-11
Payer: COMMERCIAL

## 2024-01-11 DIAGNOSIS — R35.0 FREQUENCY OF MICTURITION: ICD-10-CM

## 2024-01-11 DIAGNOSIS — Z86.39 PERSONAL HISTORY OF OTHER ENDOCRINE, NUTRITIONAL AND METABOLIC DISEASE: ICD-10-CM

## 2024-01-11 DIAGNOSIS — N52.9 MALE ERECTILE DYSFUNCTION, UNSPECIFIED: ICD-10-CM

## 2024-01-11 PROCEDURE — 51798 US URINE CAPACITY MEASURE: CPT

## 2024-01-11 PROCEDURE — 51741 ELECTRO-UROFLOWMETRY FIRST: CPT

## 2024-01-11 PROCEDURE — 99214 OFFICE O/P EST MOD 30 MIN: CPT

## 2024-01-11 RX ORDER — ATORVASTATIN CALCIUM 80 MG/1
TABLET, FILM COATED ORAL
Refills: 0 | Status: ACTIVE | COMMUNITY

## 2024-01-11 NOTE — ASSESSMENT
[FreeTextEntry1] : Erectile dysfunction (607.84) (N52.9) Hematuria, microscopic (599.72) (R31.29) History of hypogonadism (V12.29) (Z86.39) Urinary frequency (788.41) (R35.0) Screening for prostate cancer (V76.44) (Z12.5) Doing well  continues on IC  22 units/ 1 hour    libido "low"  had been on T replacement then stopped secondary to insurance  will recheck T  "internist said low"  will reassess    flow excellent  mild LUTS  PVR 44  not interested in medical therapy    [employer] degree  second daughter Mari        5.31.2022      LUTS not interested in medical management or minimally invasive treatment  flow and PVR stable    ED continues on BIMIX 20 units  no curvature pain or prolonged erection  pleased  gets erection with tadalafil; not sufficient for SI    history of hypogonadism  had been on T ; lost insurance approval; subsequently levels satisfactory  will recheck    Plan:  1. tadalafil  2. BIMIX 30/2  3. PSA, BMP and T  4. followup in 6 months    1.3.2023  returns re ED  continues on tadalafil and BIMIX, good response  no curvature    IPSS 14  PATRICIA 18  flow: avg rate 4.8 ml/s, max rate 10.3 ml/s (voided 54 cc)  PVR 0 cc  not interested in medical treatment      discussed prior labs  PSA 0.53  T 273  DEXA scan normal  had been on T replacement  stopped secondary to insurance issues  interested in T replacement    We had an extensive discussion re Risks of T replacement; Patient was informed about Black box warning from FDA.  We discussed recent data regarding increased risk of coronary plaque size, heart disease; thrombolic event; increased Hbg/Hct, breast tenderness; acne; irritability; sleep apnea and increased urinary symptoms; effect on testicular function including suppression of spermatogenesis; hair loss (male pattern baldness) effect on LFTS; effect on prostate cancer.  Different treatment approaches were discussed including IM, transdermal and Testopel  We discussed advantages and disadvantages of each  We discussed risk to T exposure to partners and children from transdermal approaches.  Patient choses to proceed with transdermal T replacement      Plan:  1. not interested in medical treatment for LUTS  2. continues on tadalafil and BIMIX  3. low T again document; wants to reinstitute  4. AndroGel  5. labs in 3 weeks  6. fu in 4 weeks    7/12/2023    Patient returns re sexual dysfunction  we again reviewed his testosterone levels as well as the indications for testosterone supplementation. He is unhappy using transdermal testosterone. We discussed IM testosterone cypionate versus AVEED.  Patient previously on T and does not think it effected function  Will again consider       He is doing well with oral medications as well as BIMIX injections as needed.   He does not always utilize the BIMIX  Injects 25 units BIMIX; last several hours  Detumesced spontaneously  Discussed tadalafil alone; patient not confident    LUTS mild and not bothersome to patient  Still not interested in medical therapy    Discussed PSA monitoring and controversy.  Patient wants to proceed,    Plan:  1. PSA  2. tadalafil  3 BIMIX  4. followup in 6 months.   1.11.2024 returns utilizing BIMIX and tadalafil Bimix 22 units pleased with erection quality and duration detumesce soon after orgasm  LUTS mild nocturia generally once flow and PVR (5) reviewed  discussed hypogonadism patient not willing to proceed with T  not covered by insurance bone density next year will arrange with PCP  Plan: renewal  bimix  tadalafil

## 2024-01-11 NOTE — HISTORY OF PRESENT ILLNESS
[FreeTextEntry1] : Patient started on BIMIX Has dose escalated on 14 units Erection 8-9/10 lasted 1 hour Able to have SI No pain No curvature  12.4.2017 utilizing BIMIX 14 units "supplementing with Cialis 20 mg" lasting one hour  erection 7.5/10 has not dose escalated  no curvature  6.25.2018 followup ED Cialis by itself not as effective  needs BIMIX 14-15 better 8-9/10 Has not attempted to BIMIX dose escalation not effective for 15 minutes after injection last 1 hour  12.10.2018 continues to utilize BIMIX 20 units excellent erection 2-3 per month utilizes Cialis 20 mg cha urinary frequency persists; variable  12.16.2019 urinary frequency  variable nocturia no hematuria  no dysuria taking Cialis variable utilizing BIMIX injection 22 units satisfactory pleased  8.10.2020  4.20.2021 continue on Bimix 24 units excellent response pleased with response tadalafil 20  urinary frequency IPSS 13  5.31.2022 started on losartan for bp receiving infusion for neuropathy continues to use BIMIX  for sexual intercourse 20 units; erection x one hour oral medications not sufficient no penile curvature or pain  1.3.2023 continue on Bimix sexual intercourse once per month low libido had been on T previously  1.12..2024 being seen at [facility]"" [facility]

## 2024-01-11 NOTE — PHYSICAL EXAM
[Urethral Meatus] : meatus normal [Penis Abnormality] : normal circumcised penis [Urinary Bladder Findings] : the bladder was normal on palpation [Scrotum] : the scrotum was normal [Testes Mass (___cm)] : there were no testicular masses [Testes Tenderness] : no tenderness of the testes [Prostate Tenderness] : the prostate was not tender [No Prostate Nodules] : no prostate nodules [FreeTextEntry1] :  10 cm; no induration; OSVALDO CLEMENT was offered to have a chaperone present  and declined. flow  10.2; voided volume 54; PVR 5

## 2024-01-12 ENCOUNTER — TRANSCRIPTION ENCOUNTER (OUTPATIENT)
Age: 69
End: 2024-01-12

## 2024-01-12 LAB — PSA SERPL-MCNC: 0.88 NG/ML

## 2024-01-16 ENCOUNTER — TRANSCRIPTION ENCOUNTER (OUTPATIENT)
Age: 69
End: 2024-01-16

## 2024-04-26 ENCOUNTER — TRANSCRIPTION ENCOUNTER (OUTPATIENT)
Age: 69
End: 2024-04-26

## 2024-04-26 RX ORDER — PAPAVERINE HYDROCHLORIDE 30 MG/ML
30 INJECTION, SOLUTION INTRAVENOUS
Qty: 2 | Refills: 2 | Status: ACTIVE | COMMUNITY
Start: 2017-06-29 | End: 1900-01-01

## 2024-04-26 RX ORDER — TADALAFIL 20 MG/1
20 TABLET ORAL
Qty: 20 | Refills: 0 | Status: ACTIVE | COMMUNITY
Start: 2019-12-16 | End: 1900-01-01

## 2024-07-23 ENCOUNTER — APPOINTMENT (OUTPATIENT)
Dept: UROLOGY | Facility: CLINIC | Age: 69
End: 2024-07-23
Payer: COMMERCIAL

## 2024-07-23 DIAGNOSIS — Z86.39 PERSONAL HISTORY OF OTHER ENDOCRINE, NUTRITIONAL AND METABOLIC DISEASE: ICD-10-CM

## 2024-07-23 DIAGNOSIS — R35.0 FREQUENCY OF MICTURITION: ICD-10-CM

## 2024-07-23 DIAGNOSIS — Z12.5 ENCOUNTER FOR SCREENING FOR MALIGNANT NEOPLASM OF PROSTATE: ICD-10-CM

## 2024-07-23 DIAGNOSIS — N52.9 MALE ERECTILE DYSFUNCTION, UNSPECIFIED: ICD-10-CM

## 2024-07-23 DIAGNOSIS — R31.29 OTHER MICROSCOPIC HEMATURIA: ICD-10-CM

## 2024-07-23 DIAGNOSIS — N53.19 OTHER EJACULATORY DYSFUNCTION: ICD-10-CM

## 2024-07-23 PROCEDURE — G2211 COMPLEX E/M VISIT ADD ON: CPT | Mod: NC

## 2024-07-23 PROCEDURE — 51741 ELECTRO-UROFLOWMETRY FIRST: CPT

## 2024-07-23 PROCEDURE — 51798 US URINE CAPACITY MEASURE: CPT

## 2024-07-23 PROCEDURE — 99214 OFFICE O/P EST MOD 30 MIN: CPT

## 2024-07-23 NOTE — PHYSICAL EXAM
[Urethral Meatus] : meatus normal [Penis Abnormality] : normal circumcised penis [Urinary Bladder Findings] : the bladder was normal on palpation [Scrotum] : the scrotum was normal [Testes Tenderness] : no tenderness of the testes [Testes Mass (___cm)] : there were no testicular masses [Prostate Tenderness] : the prostate was not tender [No Prostate Nodules] : no prostate nodules [FreeTextEntry1] :  10 cm; no induration; OSVALDO CLEMENT was offered to have a chaperone present  and declined. flow  10.2; voided volume 54; PVR 5

## 2024-07-23 NOTE — HISTORY OF PRESENT ILLNESS
[FreeTextEntry1] : Patient started on BIMIX Has dose escalated on 14 units Erection 8-9/10 lasted 1 hour Able to have SI No pain No curvature  12.4.2017 utilizing BIMIX 14 units "supplementing with Cialis 20 mg" lasting one hour  erection 7.5/10 has not dose escalated  no curvature  6.25.2018 followup ED Cialis by itself not as effective  needs BIMIX 14-15 better 8-9/10 Has not attempted to BIMIX dose escalation not effective for 15 minutes after injection last 1 hour  12.10.2018 continues to utilize BIMIX 20 units excellent erection 2-3 per month utilizes Cialis 20 mg cha urinary frequency persists; variable  12.16.2019 urinary frequency  variable nocturia no hematuria  no dysuria taking Cialis variable utilizing BIMIX injection 22 units satisfactory pleased  8.10.2020  4.20.2021 continue on Bimix 24 units excellent response pleased with response tadalafil 20  urinary frequency IPSS 13  5.31.2022 started on losartan for bp receiving infusion for neuropathy continues to use BIMIX  for sexual intercourse 20 units; erection x one hour oral medications not sufficient no penile curvature or pain  1.3.2023 continue on Bimix sexual intercourse once per month low libido had been on T previously  1.12..2024 being seen at [facility] health style program    7.23.2024  Subjective:   - Summary: The patient, Mr. Goldy Almendarez, complains of low urine flow and frequent urination during the night but prefers not to take any medications at this stage. He reports satisfactory sexual function with the help of injections and pills. He has neuropathy, which he has been experiencing for a few years.   - Chief Complaint (CC): Frequent urination at night and low urine flow   - History of Present Illness (HPI): Mr. Case has been experiencing low urine flow, particularly in the mornings, and frequent urination at night for an unspecified period. His flow rate is 8.6 cc/sec with a voided volume of 153.3 cc, and a post-void residual of 51 cc. His IPSS score is 12 and shira score is 16. The frequency of urination is not being affected by alcohol consumption, as originally suspected.   - Past Medical History: Long-term neuropathy.   - Past Surgical History:    - Family History:    - Social History: Drinks water heavily at dinner and consumes alcohol on Thursday, Friday, and Saturday nights. Coffee is not consumed at night.   - Review of Systems:    - Medications: Uses injections and pills for sexual function.   - Allergies:    Objective:   - Diagnostic Results: Last PSA was 0.88, six months ago. We will need to repeat the urinalysis.   - Vital Signs:    - Physical Examination (PE): The examination is normal. The phallus is normal without any induration. Prostate is smooth and benign. Testicles are without masses.   Assessment:   - Summary: Mr. Case is experiencing low urine flow and frequent urination at night, despite maintaining a normal physical examination. His sexual function is satisfactory with the help of injections and pills. He has neuropathy, but that isn't new.   - Problems:     - Low urinary flow     - Frequent urination at night   - Differential Diagnosis:     - Prostate issues     - Urinary tract issues   Plan:   - Summary: We are planning to repeat Mr. Almendarez's urinalysis just as a baseline. Renewal of tadalafil 20 mg and BIMIX injection had been made and will be sent to [facility]. His condition will continue to be monitored during his next visit in six weeks.   - Plan:     - Repeat urinalysis     - Renew Tadalafil 20mg     - Renew BIMIX injection     - Follow-up visit in six weeks

## 2024-07-23 NOTE — ASSESSMENT
[FreeTextEntry1] : Erectile dysfunction (607.84) (N52.9) Hematuria, microscopic (599.72) (R31.29) History of hypogonadism (V12.29) (Z86.39) Urinary frequency (788.41) (R35.0) Screening for prostate cancer (V76.44) (Z12.5) Doing well  continues on IC  22 units/ 1 hour    libido "low"  had been on T replacement then stopped secondary to insurance  will recheck T  "internist said low"  will reassess    flow excellent  mild LUTS  PVR 44  not interested in medical therapy    [employer] [place] education degree  second daughter Mari        5.31.2022      LUTS not interested in medical management or minimally invasive treatment  flow and PVR stable    ED continues on BIMIX 20 units  no curvature pain or prolonged erection  pleased  gets erection with tadalafil; not sufficient for SI    history of hypogonadism  had been on T ; lost insurance approval; subsequently levels satisfactory  will recheck    Plan:  1. tadalafil  2. BIMIX 30/2  3. PSA, BMP and T  4. followup in 6 months    1.3.2023  returns re ED  continues on tadalafil and BIMIX, good response  no curvature    IPSS 14  PATRICIA 18  flow: avg rate 4.8 ml/s, max rate 10.3 ml/s (voided 54 cc)  PVR 0 cc  not interested in medical treatment      discussed prior labs  PSA 0.53  T 273  DEXA scan normal  had been on T replacement  stopped secondary to insurance issues  interested in T replacement    We had an extensive discussion re Risks of T replacement; Patient was informed about Black box warning from FDA.  We discussed recent data regarding increased risk of coronary plaque size, heart disease; thrombolic event; increased Hbg/Hct, breast tenderness; acne; irritability; sleep apnea and increased urinary symptoms; effect on testicular function including suppression of spermatogenesis; hair loss (male pattern baldness) effect on LFTS; effect on prostate cancer.  Different treatment approaches were discussed including IM, transdermal and Testopel  We discussed advantages and disadvantages of each  We discussed risk to T exposure to partners and children from transdermal approaches.  Patient choses to proceed with transdermal T replacement      Plan:  1. not interested in medical treatment for LUTS  2. continues on tadalafil and BIMIX  3. low T again document; wants to reinstitute  4. AndroGel  5. labs in 3 weeks  6. fu in 4 weeks    7/12/2023    Patient returns re sexual dysfunction  we again reviewed his testosterone levels as well as the indications for testosterone supplementation. He is unhappy using transdermal testosterone. We discussed IM testosterone cypionate versus AVEED.  Patient previously on T and does not think it effected function  Will again consider       He is doing well with oral medications as well as BIMIX injections as needed.   He does not always utilize the BIMIX  Injects 25 units BIMIX; last several hours  Detumesced spontaneously  Discussed tadalafil alone; patient not confident    LUTS mild and not bothersome to patient  Still not interested in medical therapy    Discussed PSA monitoring and controversy.  Patient wants to proceed,    Plan:  1. PSA  2. tadalafil  3 BIMIX  4. followup in 6 months.   1.11.2024 returns utilizing BIMIX and tadalafil Bimix 22 units pleased with erection quality and duration detumesce soon after orgasm  LUTS mild nocturia generally once flow and PVR (5) reviewed  discussed hypogonadism patient not willing to proceed with T  not covered by insurance bone density next year will arrange with PCP  Plan: renewal  bimix  tadalafil  7.23.2024  Summary: We are planning to repeat Mr. Almendarez's urinalysis just as a baseline. Renewal of tadalafil 20 mg and BIMIX injection had been made and will be sent to [facility]. His condition will continue to be monitored during his next visit in six weeks.   - Plan:     - Repeat urinalysis     - Renew Tadalafil 20mg     - Renew BIMIX injection     - Follow-up visit in six weeks

## 2024-07-23 NOTE — HISTORY OF PRESENT ILLNESS
[FreeTextEntry1] : Patient started on BIMIX Has dose escalated on 14 units Erection 8-9/10 lasted 1 hour Able to have SI No pain No curvature  12.4.2017 utilizing BIMIX 14 units "supplementing with Cialis 20 mg" lasting one hour  erection 7.5/10 has not dose escalated  no curvature  6.25.2018 followup ED Cialis by itself not as effective  needs BIMIX 14-15 better 8-9/10 Has not attempted to BIMIX dose escalation not effective for 15 minutes after injection last 1 hour  12.10.2018 continues to utilize BIMIX 20 units excellent erection 2-3 per month utilizes Cialis 20 mg cha urinary frequency persists; variable  12.16.2019 urinary frequency  variable nocturia no hematuria  no dysuria taking Cialis variable utilizing BIMIX injection 22 units satisfactory pleased  8.10.2020  4.20.2021 continue on Bimix 24 units excellent response pleased with response tadalafil 20  urinary frequency IPSS 13  5.31.2022 started on losartan for bp receiving infusion for neuropathy continues to use BIMIX  for sexual intercourse 20 units; erection x one hour oral medications not sufficient no penile curvature or pain  1.3.2023 continue on Bimix sexual intercourse once per month low libido had been on T previously  1.12..2024 being seen at [facility] health style program    7.23.2024  Subjective:   - Summary: The patient, Mr. Goldy Almendarez, complains of low urine flow and frequent urination during the night but prefers not to take any medications at this stage. He reports satisfactory sexual function with the help of injections and pills. He has neuropathy, which he has been experiencing for a few years.   - Chief Complaint (CC): Frequent urination at night and low urine flow   - History of Present Illness (HPI): Mr. Case has been experiencing low urine flow, particularly in the mornings, and frequent urination at night for an unspecified period. His flow rate is 8.6 cc/sec with a voided volume of 153.3 cc, and a post-void residual of 51 cc. His IPSS score is 12 and shira score is 16. The frequency of urination is not being affected by alcohol consumption, as originally suspected.   - Past Medical History: Long-term neuropathy.   - Past Surgical History:    - Family History:    - Social History: Drinks water heavily at dinner and consumes alcohol on Thursday, Friday, and Saturday nights. Coffee is not consumed at night.   - Review of Systems:    - Medications: Uses injections and pills for sexual function.   - Allergies:    Objective:   - Diagnostic Results: Last PSA was 0.88, six months ago. We will need to repeat the urinalysis.   - Vital Signs:    - Physical Examination (PE): The examination is normal. The phallus is normal without any induration. Prostate is smooth and benign. Testicles are without masses.   Assessment:   - Summary: Mr. Case is experiencing low urine flow and frequent urination at night, despite maintaining a normal physical examination. His sexual function is satisfactory with the help of injections and pills. He has neuropathy, but that isn't new.   - Problems:     - Low urinary flow     - Frequent urination at night   - Differential Diagnosis:     - Prostate issues     - Urinary tract issues   Plan:   - Summary: We are planning to repeat Mr. Almendarez's urinalysis just as a baseline. Renewal of tadalafil 20 mg and BIMIX injection had been made and will be sent to [facility]. His condition will continue to be monitored during his next visit in six weeks.   - Plan:     - Repeat urinalysis     - Renew Tadalafil 20mg     - Renew BIMIX injection     - Follow-up visit in six weeks

## 2024-07-23 NOTE — ASSESSMENT
[FreeTextEntry1] : Erectile dysfunction (607.84) (N52.9) Hematuria, microscopic (599.72) (R31.29) History of hypogonadism (V12.29) (Z86.39) Urinary frequency (788.41) (R35.0) Screening for prostate cancer (V76.44) (Z12.5) Doing well  continues on IC  22 units/ 1 hour    libido "low"  had been on T replacement then stopped secondary to insurance  will recheck T  "internist said low"  will reassess    flow excellent  mild LUTS  PVR 44  not interested in medical therapy    [employer] [place] education degree  second daughter Mari        5.31.2022      LUTS not interested in medical management or minimally invasive treatment  flow and PVR stable    ED continues on BIMIX 20 units  no curvature pain or prolonged erection  pleased  gets erection with tadalafil; not sufficient for SI    history of hypogonadism  had been on T ; lost insurance approval; subsequently levels satisfactory  will recheck    Plan:  1. tadalafil  2. BIMIX 30/2  3. PSA, BMP and T  4. followup in 6 months    1.3.2023  returns re ED  continues on tadalafil and BIMIX, good response  no curvature    IPSS 14  PATRICIA 18  flow: avg rate 4.8 ml/s, max rate 10.3 ml/s (voided 54 cc)  PVR 0 cc  not interested in medical treatment      discussed prior labs  PSA 0.53  T 273  DEXA scan normal  had been on T replacement  stopped secondary to insurance issues  interested in T replacement    We had an extensive discussion re Risks of T replacement; Patient was informed about Black box warning from FDA.  We discussed recent data regarding increased risk of coronary plaque size, heart disease; thrombolic event; increased Hbg/Hct, breast tenderness; acne; irritability; sleep apnea and increased urinary symptoms; effect on testicular function including suppression of spermatogenesis; hair loss (male pattern baldness) effect on LFTS; effect on prostate cancer.  Different treatment approaches were discussed including IM, transdermal and Testopel  We discussed advantages and disadvantages of each  We discussed risk to T exposure to partners and children from transdermal approaches.  Patient choses to proceed with transdermal T replacement      Plan:  1. not interested in medical treatment for LUTS  2. continues on tadalafil and BIMIX  3. low T again document; wants to reinstitute  4. AndroGel  5. labs in 3 weeks  6. fu in 4 weeks    7/12/2023    Patient returns re sexual dysfunction  we again reviewed his testosterone levels as well as the indications for testosterone supplementation. He is unhappy using transdermal testosterone. We discussed IM testosterone cypionate versus AVEED.  Patient previously on T and does not think it effected function  Will again consider       He is doing well with oral medications as well as BIMIX injections as needed.   He does not always utilize the BIMIX  Injects 25 units BIMIX; last several hours  Detumesced spontaneously  Discussed tadalafil alone; patient not confident    LUTS mild and not bothersome to patient  Still not interested in medical therapy    Discussed PSA monitoring and controversy.  Patient wants to proceed,    Plan:  1. PSA  2. tadalafil  3 BIMIX  4. followup in 6 months.   1.11.2024 returns utilizing BIMIX and tadalafil Bimix 22 units pleased with erection quality and duration detumesce soon after orgasm  LUTS mild nocturia generally once flow and PVR (5) reviewed  discussed hypogonadism patient not willing to proceed with T  not covered by insurance bone density next year will arrange with PCP  Plan: renewal  bimix  tadalafil  7.23.2024  Summary: We are planning to repeat Mr. Almendarez's urinalysis just as a baseline. Renewal of tadalafil 20 mg and BIMIX injection had been made and will be sent to [facility]. His condition will continue to be monitored during his next visit in six weeks.   - Plan:     - Repeat urinalysis     - Renew Tadalafil 20mg     - Renew BIMIX injection     - Follow-up visit in six weeks

## 2024-07-24 ENCOUNTER — TRANSCRIPTION ENCOUNTER (OUTPATIENT)
Age: 69
End: 2024-07-24

## 2024-07-24 LAB
APPEARANCE: CLEAR
BACTERIA: NEGATIVE /HPF
BILIRUBIN URINE: NEGATIVE
BLOOD URINE: NEGATIVE
CAST: 0 /LPF
COLOR: YELLOW
EPITHELIAL CELLS: 0 /HPF
GLUCOSE QUALITATIVE U: NEGATIVE MG/DL
KETONES URINE: NEGATIVE MG/DL
LEUKOCYTE ESTERASE URINE: NEGATIVE
MICROSCOPIC-UA: NORMAL
NITRITE URINE: NEGATIVE
PH URINE: 5.5
PROTEIN URINE: NEGATIVE MG/DL
RED BLOOD CELLS URINE: 0 /HPF
SPECIFIC GRAVITY URINE: 1.01
UROBILINOGEN URINE: 0.2 MG/DL
WHITE BLOOD CELLS URINE: 0 /HPF

## 2024-12-25 PROBLEM — F10.90 ALCOHOL USE: Status: ACTIVE | Noted: 2017-04-10

## 2025-01-07 ENCOUNTER — APPOINTMENT (OUTPATIENT)
Dept: UROLOGY | Facility: CLINIC | Age: 70
End: 2025-01-07
Payer: COMMERCIAL

## 2025-01-07 ENCOUNTER — NON-APPOINTMENT (OUTPATIENT)
Age: 70
End: 2025-01-07

## 2025-01-07 VITALS
SYSTOLIC BLOOD PRESSURE: 166 MMHG | HEIGHT: 76 IN | WEIGHT: 210 LBS | OXYGEN SATURATION: 97 % | DIASTOLIC BLOOD PRESSURE: 78 MMHG | BODY MASS INDEX: 25.57 KG/M2 | TEMPERATURE: 97.7 F | HEART RATE: 57 BPM

## 2025-01-07 DIAGNOSIS — N53.19 OTHER EJACULATORY DYSFUNCTION: ICD-10-CM

## 2025-01-07 DIAGNOSIS — R31.29 OTHER MICROSCOPIC HEMATURIA: ICD-10-CM

## 2025-01-07 DIAGNOSIS — R35.0 FREQUENCY OF MICTURITION: ICD-10-CM

## 2025-01-07 DIAGNOSIS — Z86.39 PERSONAL HISTORY OF OTHER ENDOCRINE, NUTRITIONAL AND METABOLIC DISEASE: ICD-10-CM

## 2025-01-07 DIAGNOSIS — N52.9 MALE ERECTILE DYSFUNCTION, UNSPECIFIED: ICD-10-CM

## 2025-01-07 DIAGNOSIS — Z12.5 ENCOUNTER FOR SCREENING FOR MALIGNANT NEOPLASM OF PROSTATE: ICD-10-CM

## 2025-01-07 PROCEDURE — 51741 ELECTRO-UROFLOWMETRY FIRST: CPT

## 2025-01-07 PROCEDURE — 99214 OFFICE O/P EST MOD 30 MIN: CPT

## 2025-01-07 PROCEDURE — 51798 US URINE CAPACITY MEASURE: CPT

## 2025-01-08 ENCOUNTER — TRANSCRIPTION ENCOUNTER (OUTPATIENT)
Age: 70
End: 2025-01-08

## 2025-01-08 LAB
APPEARANCE: CLEAR
BACTERIA: NEGATIVE /HPF
BILIRUBIN URINE: NEGATIVE
BLOOD URINE: NEGATIVE
CAST: 0 /LPF
COLOR: YELLOW
EPITHELIAL CELLS: 0 /HPF
GLUCOSE QUALITATIVE U: NEGATIVE MG/DL
KETONES URINE: NEGATIVE MG/DL
LEUKOCYTE ESTERASE URINE: NEGATIVE
MICROSCOPIC-UA: NORMAL
NITRITE URINE: NEGATIVE
PH URINE: 5.5
PROTEIN URINE: NEGATIVE MG/DL
PSA SERPL-MCNC: 0.7 NG/ML
RED BLOOD CELLS URINE: 1 /HPF
SPECIFIC GRAVITY URINE: 1.02
UROBILINOGEN URINE: 0.2 MG/DL
WHITE BLOOD CELLS URINE: 0 /HPF

## 2025-07-23 ENCOUNTER — APPOINTMENT (OUTPATIENT)
Dept: UROLOGY | Facility: CLINIC | Age: 70
End: 2025-07-23
Payer: COMMERCIAL

## 2025-07-23 DIAGNOSIS — N52.9 MALE ERECTILE DYSFUNCTION, UNSPECIFIED: ICD-10-CM

## 2025-07-23 DIAGNOSIS — R31.29 OTHER MICROSCOPIC HEMATURIA: ICD-10-CM

## 2025-07-23 DIAGNOSIS — R35.0 FREQUENCY OF MICTURITION: ICD-10-CM

## 2025-07-23 DIAGNOSIS — Z86.39 PERSONAL HISTORY OF OTHER ENDOCRINE, NUTRITIONAL AND METABOLIC DISEASE: ICD-10-CM

## 2025-07-23 PROCEDURE — G2211 COMPLEX E/M VISIT ADD ON: CPT | Mod: NC

## 2025-07-23 PROCEDURE — 99214 OFFICE O/P EST MOD 30 MIN: CPT

## 2025-07-23 PROCEDURE — 51741 ELECTRO-UROFLOWMETRY FIRST: CPT

## 2025-07-23 PROCEDURE — 51798 US URINE CAPACITY MEASURE: CPT

## 2025-07-24 LAB
APPEARANCE: CLEAR
BACTERIA: NEGATIVE /HPF
BILIRUBIN URINE: NEGATIVE
BLOOD URINE: NEGATIVE
CAST: 0 /LPF
COLOR: YELLOW
EPITHELIAL CELLS: 0 /HPF
GLUCOSE QUALITATIVE U: NEGATIVE MG/DL
KETONES URINE: NEGATIVE MG/DL
LEUKOCYTE ESTERASE URINE: NEGATIVE
MICROSCOPIC-UA: NORMAL
NITRITE URINE: NEGATIVE
PH URINE: 5
PROTEIN URINE: NEGATIVE MG/DL
RED BLOOD CELLS URINE: 2 /HPF
SPECIFIC GRAVITY URINE: 1.03
TESTOST SERPL-MCNC: 203 NG/DL
UROBILINOGEN URINE: 0.2 MG/DL
WHITE BLOOD CELLS URINE: 0 /HPF